# Patient Record
Sex: FEMALE | Race: WHITE | Employment: STUDENT | URBAN - METROPOLITAN AREA
[De-identification: names, ages, dates, MRNs, and addresses within clinical notes are randomized per-mention and may not be internally consistent; named-entity substitution may affect disease eponyms.]

---
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
## 2024-07-29 ENCOUNTER — OFFICE VISIT (OUTPATIENT)
Dept: INTERNAL MEDICINE CLINIC | Age: 19
End: 2024-07-29
Payer: COMMERCIAL

## 2024-07-29 VITALS
SYSTOLIC BLOOD PRESSURE: 120 MMHG | BODY MASS INDEX: 22.84 KG/M2 | HEART RATE: 69 BPM | HEIGHT: 61 IN | DIASTOLIC BLOOD PRESSURE: 62 MMHG | OXYGEN SATURATION: 99 % | WEIGHT: 121 LBS

## 2024-07-29 DIAGNOSIS — Z00.00 ROUTINE GENERAL MEDICAL EXAMINATION AT A HEALTH CARE FACILITY: Primary | ICD-10-CM

## 2024-07-29 DIAGNOSIS — Z00.00 ENCOUNTER FOR WELL ADULT EXAM WITHOUT ABNORMAL FINDINGS: ICD-10-CM

## 2024-07-29 DIAGNOSIS — R79.89 ELEVATED TSH: ICD-10-CM

## 2024-07-29 PROBLEM — L70.0 ACNE VULGARIS: Status: RESOLVED | Noted: 2023-07-26 | Resolved: 2024-07-29

## 2024-07-29 PROBLEM — K58.1 IRRITABLE BOWEL SYNDROME WITH CONSTIPATION: Status: RESOLVED | Noted: 2023-07-26 | Resolved: 2024-07-29

## 2024-07-29 PROCEDURE — 99395 PREV VISIT EST AGE 18-39: CPT | Performed by: INTERNAL MEDICINE

## 2024-07-29 SDOH — ECONOMIC STABILITY: HOUSING INSECURITY
IN THE LAST 12 MONTHS, WAS THERE A TIME WHEN YOU DID NOT HAVE A STEADY PLACE TO SLEEP OR SLEPT IN A SHELTER (INCLUDING NOW)?: NO

## 2024-07-29 SDOH — ECONOMIC STABILITY: FOOD INSECURITY: WITHIN THE PAST 12 MONTHS, THE FOOD YOU BOUGHT JUST DIDN'T LAST AND YOU DIDN'T HAVE MONEY TO GET MORE.: NEVER TRUE

## 2024-07-29 SDOH — ECONOMIC STABILITY: FOOD INSECURITY: WITHIN THE PAST 12 MONTHS, YOU WORRIED THAT YOUR FOOD WOULD RUN OUT BEFORE YOU GOT MONEY TO BUY MORE.: NEVER TRUE

## 2024-07-29 SDOH — ECONOMIC STABILITY: INCOME INSECURITY: HOW HARD IS IT FOR YOU TO PAY FOR THE VERY BASICS LIKE FOOD, HOUSING, MEDICAL CARE, AND HEATING?: NOT HARD AT ALL

## 2024-07-29 ASSESSMENT — PATIENT HEALTH QUESTIONNAIRE - PHQ9
SUM OF ALL RESPONSES TO PHQ QUESTIONS 1-9: 0
1. LITTLE INTEREST OR PLEASURE IN DOING THINGS: NOT AT ALL
SUM OF ALL RESPONSES TO PHQ QUESTIONS 1-9: 0
2. FEELING DOWN, DEPRESSED OR HOPELESS: NOT AT ALL
SUM OF ALL RESPONSES TO PHQ9 QUESTIONS 1 & 2: 0
SUM OF ALL RESPONSES TO PHQ QUESTIONS 1-9: 0
SUM OF ALL RESPONSES TO PHQ QUESTIONS 1-9: 0

## 2024-07-29 NOTE — PATIENT INSTRUCTIONS
Your antiperoxidase antibodies were equivocal last yr.  We'll recheck levels now, you may have risk for Hashimoto's thyroiditis but you do NOT have this currently.  We are just monitoring.       Well Visit, Ages 18 to 65: Care Instructions  Well visits can help you stay healthy. Your doctor has checked your overall health and may have suggested ways to take good care of yourself. Your doctor also may have recommended tests. You can help prevent illness with healthy eating, good sleep, vaccinations, regular exercise, and other steps.    Get the tests that you and your doctor decide on. Depending on your age and risks, examples might include screening for diabetes; hepatitis C; HIV; and cervical, breast, lung, and colon cancer. Screening helps find diseases before any symptoms appear.   Eat healthy foods. Choose fruits, vegetables, whole grains, lean protein, and low-fat dairy foods. Limit saturated fat and reduce salt.     Limit alcohol. Men should have no more than 2 drinks a day. Women should have no more than 1. For some people, no alcohol is the best choice.   Exercise. Get at least 30 minutes of exercise on most days of the week. Walking can be a good choice.     Reach and stay at your healthy weight. This will lower your risk for many health problems.   Take care of your mental health. Try to stay connected with friends, family, and community, and find ways to manage stress.     If you're feeling depressed or hopeless, talk to someone. A counselor can help. If you don't have a counselor, talk to your doctor.   Talk to your doctor if you think you may have a problem with alcohol or drug use. This includes prescription medicines, marijuana, and other drugs.     Avoid tobacco and nicotine: Don't smoke, vape, or chew. If you need help quitting, talk to your doctor.   Practice safer sex. Getting tested, using condoms or dental dams, and limiting sex partners can help prevent STIs.     Use birth control if it's

## 2024-07-29 NOTE — PROGRESS NOTES
Internal Medicine  History and Physical        Sugar Pack  YOB: 2005    Date of Service:  7/29/2024      Chief Complaint:   Sugar Pack is a 18 y.o. female who presents for a comprehensive physical    HPI:   Is at [employer] and to be on waiting list for Rad tech.    Had sl high TSH last yr and equivocal antibodies TPO, then recheck TSH was normal. Denies fatigue, mother w hx of hyperthyroid    Acne better and off topical meds, no problems w IBS now either    Patient Active Problem List   Diagnosis    Irritable bowel syndrome with constipation    Acne vulgaris    Elevated TSH       Preventive Care:  Health Maintenance   Topic Date Due    COVID-19 Vaccine (1) Never done    HPV vaccine (3 - 2-dose series) 11/30/2016    HIV screen  Never done    Chlamydia/GC screen  Never done    Depression Screen  07/26/2024    Flu vaccine (1) 08/01/2024    DTaP/Tdap/Td vaccine (7 - Td or Tdap) 05/31/2026    Hepatitis A vaccine  Completed    Hepatitis B vaccine  Completed    Hib vaccine  Completed    Polio vaccine  Completed    Varicella vaccine  Completed    Meningococcal (ACWY) vaccine  Completed    Hepatitis C screen  Completed    Pneumococcal 0-64 years Vaccine  Aged Out    Measles,Mumps,Rubella (MMR) vaccine  Discontinued        Lipid panel:   Lab Results   Component Value Date/Time    TRIG 73 07/29/2023 09:12 AM    HDL 55 07/29/2023 09:12 AM        Immunization History   Administered Date(s) Administered    DTaP, DAPTACEL, (age 6w-6y), IM, 0.5mL 03/21/2006, 03/02/2009    IHnZ-WLNP-FPS, PEDIARIX, (age 6w-6y), IM, 0.5mL 02/26/2005, 04/30/2005, 07/02/2005    HPV, GARDASIL 9, (age 9y-45y), IM, 0.5mL 05/31/2016, 07/01/2016    Hep A, HAVRIX, VAQTA, (age 12m-18y), IM, 0.5mL 12/21/2006, 12/12/2007    Hib PRP-T, ACTHIB (age 2m-5y, Adlt Risk), HIBERIX (age 6w-4y, Adlt Risk), IM, 0.5mL 02/26/2005, 04/30/2005, 07/02/2005, 12/19/2005    Influenza, FLUARIX, FLULAVAL, FLUZONE (age 6 mo+) AND AFLURIA, (age 3 y+), PF, 0.5mL

## 2024-08-13 LAB
A/G RATIO: 2.1 RATIO (ref 0.8–2.6)
ALBUMIN: 4.5 G/DL (ref 3.5–5.2)
ALP BLD-CCNC: 57 U/L (ref 23–144)
ALT SERPL-CCNC: 10 U/L (ref 0–60)
AST SERPL-CCNC: 13 U/L (ref 0–55)
BILIRUB SERPL-MCNC: 0.6 MG/DL (ref 0–1.2)
BUN / CREAT RATIO: 14 (ref 7–25)
BUN BLDV-MCNC: 10 MG/DL (ref 3–29)
CALCIUM SERPL-MCNC: 9.5 MG/DL (ref 8.5–10.5)
CHLORIDE BLD-SCNC: 104 MEQ/L (ref 96–110)
CHOLESTEROL, TOTAL: 134 MG/DL
CO2: 24 MEQ/L (ref 19–32)
CREAT SERPL-MCNC: 0.7 MG/DL (ref 0.5–1.2)
ESTIMATED GLOMERULAR FILTRATION RATE CREATININE EQUATION: 128 MLS/MIN/1.73M2
FASTING STATUS: NORMAL
GLOBULIN: 2.1 G/DL (ref 1.9–3.6)
GLUCOSE BLD-MCNC: 85 MG/DL (ref 70–99)
HCT VFR BLD CALC: 40.8 % (ref 34–49)
HDLC SERPL-MCNC: 52 MG/DL (ref 31–76)
HEMOGLOBIN: 13.8 G/DL (ref 11.2–15.7)
LDL CHOLESTEROL: 67 MG/DL
MCH RBC QN AUTO: 30.9 PG (ref 26–34)
MCHC RBC AUTO-ENTMCNC: 33.8 G/DL (ref 30.7–35.5)
MCV RBC AUTO: 91.3 FL (ref 80–100)
PDW BLD-RTO: 11.7 %
PLATELET # BLD: 276 K/UL (ref 140–400)
PMV BLD AUTO: 10.2 FL (ref 7.2–11.7)
POTASSIUM SERPL-SCNC: 4.3 MEQ/L (ref 3.4–5.3)
RBC # BLD: 4.47 M/UL (ref 3.95–5.26)
SODIUM BLD-SCNC: 141 MEQ/L (ref 135–148)
T4 FREE: 1.14 NG/DL (ref 0.8–1.8)
TOTAL PROTEIN: 6.6 G/DL (ref 6–8.3)
TRIGL SERPL-MCNC: 77 MG/DL (ref 38–152)
TSH ULTRASENSITIVE: 1.58 MCIU/ML (ref 0.5–4.3)
VLDLC SERPL CALC-MCNC: 15 MG/DL (ref 4–28)
WBC # BLD: 5.7 K/UL (ref 3.5–10.9)

## 2024-08-14 PROBLEM — R76.8 ANTI-TPO ANTIBODIES PRESENT: Status: ACTIVE | Noted: 2024-08-01

## 2024-08-14 LAB — THYROID PEROXIDASE ANTIBODY: 248 EIA

## 2024-08-16 ENCOUNTER — TELEPHONE (OUTPATIENT)
Dept: INTERNAL MEDICINE CLINIC | Age: 19
End: 2024-08-16

## 2024-08-16 DIAGNOSIS — R76.8 ANTI-TPO ANTIBODIES PRESENT: Primary | ICD-10-CM

## 2024-08-16 NOTE — TELEPHONE ENCOUNTER
JUST TO BE SAFE, LET'S HAVE HER RECHECK TSH AND FREE T4 IN 6MO THEN ALSO F/U W ME THE NEXT DAY.- DIAGNOSIS POSITIVE TPO/ANTIPEROXIDASE ANTIBODY WHICH IS ON HER DX LIST  IF SHE IS STABLE AND FEELING WELL, IT'S FINE TO JUST CHECK ANNUALLY AS SOME WILL HAVE A POSITIVE ANTIBODY AND NEVER GET THYROID PROBLEMS, OR THIS MIGHT FLARE UP 20-30 YEARS FROM NOW.    SHE SHOULD LOOK UP SYMPTOMS OF BOTH HYPER AND HYPOTHYROIDISM, THESE CAN BE VERY VAGUE BUT IF HAS EITHER TYPE OF SYMPTOMS COME ON AND ARE LASTING FOR A FEW WEEKS THEN CAN COME IN EARLIER AT ANY TIME TO RECHECK.     Labs added

## 2025-01-22 DIAGNOSIS — Z00.00 PREVENTATIVE HEALTH CARE: Primary | ICD-10-CM

## 2025-02-18 ENCOUNTER — INITIAL CONSULT (OUTPATIENT)
Dept: OBGYN | Age: 20
End: 2025-02-18
Payer: COMMERCIAL

## 2025-02-18 VITALS
HEIGHT: 61 IN | SYSTOLIC BLOOD PRESSURE: 116 MMHG | DIASTOLIC BLOOD PRESSURE: 81 MMHG | HEART RATE: 90 BPM | WEIGHT: 119 LBS | BODY MASS INDEX: 22.47 KG/M2

## 2025-02-18 DIAGNOSIS — N94.6 DYSMENORRHEA: ICD-10-CM

## 2025-02-18 DIAGNOSIS — L70.9 ACNE, UNSPECIFIED ACNE TYPE: ICD-10-CM

## 2025-02-18 DIAGNOSIS — Z01.419 ENCOUNTER FOR ANNUAL ROUTINE GYNECOLOGICAL EXAMINATION: Primary | ICD-10-CM

## 2025-02-18 PROCEDURE — 99385 PREV VISIT NEW AGE 18-39: CPT

## 2025-02-18 RX ORDER — DROSPIRENONE AND ETHINYL ESTRADIOL 0.02-3(28)
1 KIT ORAL DAILY
Qty: 1 PACKET | Refills: 4 | Status: SHIPPED | OUTPATIENT
Start: 2025-02-18

## 2025-02-18 SDOH — ECONOMIC STABILITY: FOOD INSECURITY: WITHIN THE PAST 12 MONTHS, THE FOOD YOU BOUGHT JUST DIDN'T LAST AND YOU DIDN'T HAVE MONEY TO GET MORE.: NEVER TRUE

## 2025-02-18 SDOH — ECONOMIC STABILITY: FOOD INSECURITY: WITHIN THE PAST 12 MONTHS, YOU WORRIED THAT YOUR FOOD WOULD RUN OUT BEFORE YOU GOT MONEY TO BUY MORE.: NEVER TRUE

## 2025-02-18 ASSESSMENT — ENCOUNTER SYMPTOMS
SHORTNESS OF BREATH: 0
ABDOMINAL PAIN: 0
NAUSEA: 0
DIARRHEA: 0
CHEST TIGHTNESS: 0
VOMITING: 0
CONSTIPATION: 0
GASTROINTESTINAL NEGATIVE: 1
RESPIRATORY NEGATIVE: 1

## 2025-02-18 ASSESSMENT — PATIENT HEALTH QUESTIONNAIRE - PHQ9
1. LITTLE INTEREST OR PLEASURE IN DOING THINGS: NOT AT ALL
2. FEELING DOWN, DEPRESSED OR HOPELESS: NOT AT ALL
SUM OF ALL RESPONSES TO PHQ QUESTIONS 1-9: 0
SUM OF ALL RESPONSES TO PHQ9 QUESTIONS 1 & 2: 0

## 2025-02-18 NOTE — PROGRESS NOTES
24    Sugar Pack  2005    Chief Complaint   Patient presents with    Consultation     Pt referral from [facility] for preventative health care.     Annual Exam     Annual exam. Non-smoker No known h/o dvt. Patient's last menstrual period was 2025. . Periods are regular. Patient is sexually active. Patient is not on birth control. Patient complains of aub  lasting 2 days, light, dark red. Pt wants to discuss starting bc.          The patient is a 19 y.o. female,  who presents for her annual exam.  She is menstruating normally, had some breakthrough bleeding with her last cycle.  Patient's last menstrual period was 2025. She is  sexually active. She is not currently taking birth control.    She reports no gynecological symptoms.     Past Medical History:   Diagnosis Date    Abnormal uterine bleeding (AUB)     Acne vulgaris     inactive    Anti-TPO antibodies present 2024    but tfts are normal.    Elevated TSH 2023    Irritable bowel syndrome with constipation     INACTIVE--- ONE EPISODE W ED VISIT TO [facility] APPROX .  SX LATER RESOLVED, HAD EVAL GI.       No past surgical history on file.    Family History   Problem Relation Age of Onset    Other Paternal Grandmother     Other Paternal Aunt         prolonged QT    Other Paternal Uncle         prolonged QT       Social History     Tobacco Use    Smoking status: Never     Passive exposure: Never    Smokeless tobacco: Never   Vaping Use    Vaping status: Never Used   Substance Use Topics    Alcohol use: Never    Drug use: Never        Current Outpatient Medications   Medication Sig Dispense Refill    drospirenone-ethinyl estradiol (MASSIMO) 3-0.02 MG per tablet Take 1 tablet by mouth daily 1 packet 4    vitamin C (ASCORBIC ACID) 500 MG tablet Take 1 tablet by mouth daily      Multiple Vitamins-Minerals (THERAPEUTIC MULTIVITAMIN-MINERALS) tablet Take 1 tablet by mouth daily       No current

## 2025-02-19 LAB
C TRACH DNA UR QL NAA+PROBE: NEGATIVE
N GONORRHOEA DNA UR QL NAA+PROBE: NEGATIVE

## 2025-02-22 LAB
A/G RATIO: 2 RATIO (ref 0.8–2.6)
ALBUMIN: 4.5 G/DL (ref 3.5–5.2)
ALP BLD-CCNC: 50 U/L (ref 23–144)
ALT SERPL-CCNC: 9 U/L (ref 0–60)
AST SERPL-CCNC: 13 U/L (ref 0–55)
BILIRUB SERPL-MCNC: 0.3 MG/DL (ref 0–1.2)
BUN / CREAT RATIO: 23 (ref 7–25)
BUN BLDV-MCNC: 14 MG/DL (ref 3–29)
CALCIUM SERPL-MCNC: 9.2 MG/DL (ref 8.5–10.5)
CHLORIDE BLD-SCNC: 106 MEQ/L (ref 96–110)
CHOLESTEROL, TOTAL: 137 MG/DL
CO2: 25 MEQ/L (ref 19–32)
CREAT SERPL-MCNC: 0.6 MG/DL (ref 0.5–1.2)
ESTIMATED GLOMERULAR FILTRATION RATE CREATININE EQUATION: 132 MLS/MIN/1.73M2
FASTING STATUS: NORMAL
GLOBULIN: 2.2 G/DL (ref 1.9–3.6)
GLUCOSE BLD-MCNC: 79 MG/DL (ref 70–99)
HCT VFR BLD CALC: 41.7 % (ref 34–49)
HDLC SERPL-MCNC: 46 MG/DL
HEMOGLOBIN: 13.9 G/DL (ref 11.2–15.7)
LDL CHOLESTEROL: 84 MG/DL
MCH RBC QN AUTO: 31.2 PG (ref 26–34)
MCHC RBC AUTO-ENTMCNC: 33.3 G/DL (ref 30.7–35.5)
MCV RBC AUTO: 93.5 FL (ref 80–100)
PDW BLD-RTO: 11.3 %
PLATELET # BLD: 310 K/UL (ref 140–400)
PMV BLD AUTO: 10.5 FL (ref 7.2–11.7)
POTASSIUM SERPL-SCNC: 4.4 MEQ/L (ref 3.4–5.3)
RBC # BLD: 4.46 M/UL (ref 3.95–5.26)
SODIUM BLD-SCNC: 142 MEQ/L (ref 135–148)
T4 FREE: 1.12 NG/DL (ref 0.8–1.8)
TOTAL PROTEIN: 6.7 G/DL (ref 6–8.3)
TRIGL SERPL-MCNC: 37 MG/DL
VLDLC SERPL CALC-MCNC: 7 MG/DL (ref 4–28)
WBC # BLD: 4.2 K/UL (ref 3.5–10.9)

## 2025-02-24 LAB — THYROID PEROXIDASE ANTIBODY: 172 EIA

## 2025-04-04 ENCOUNTER — TELEPHONE (OUTPATIENT)
Dept: OBGYN | Age: 20
End: 2025-04-04

## 2025-07-01 RX ORDER — RED BEET 500 MG
CAPSULE ORAL
COMMUNITY

## 2025-07-10 ENCOUNTER — ROUTINE PRENATAL (OUTPATIENT)
Age: 20
End: 2025-07-10

## 2025-07-10 VITALS
SYSTOLIC BLOOD PRESSURE: 126 MMHG | WEIGHT: 129.8 LBS | HEIGHT: 60 IN | DIASTOLIC BLOOD PRESSURE: 64 MMHG | BODY MASS INDEX: 25.48 KG/M2 | HEART RATE: 91 BPM

## 2025-07-10 DIAGNOSIS — Z3A.19 19 WEEKS GESTATION OF PREGNANCY: Primary | ICD-10-CM

## 2025-07-10 PROCEDURE — 0502F SUBSEQUENT PRENATAL CARE: CPT | Performed by: PHYSICIAN ASSISTANT

## 2025-07-10 NOTE — PROGRESS NOTES
Return OB Office Visit    CC:   Chief Complaint   Patient presents with    Routine Prenatal Visit     Pt is 3 here for ANAMARIA after u/s. Denies any LOF, VB, or contractions. Reports +FM.        HPI:  Pt seen and examined. No concerns/complaints. Feeling tired but otherwise good. Denies VB, LOF, ctx. +FM    Objective:  /64   Pulse 91   Ht 1.524 m (5')   Wt 58.9 kg (129 lb 12.8 oz)   LMP 2025   BMI 25.35 kg/m²   Gen: AO, NAD  Abd: Soft, NT  OB US results: Normal 19w M fetus. EDLV, otherwise normal survey.     Assessment/Plan:  19 y.o.  at 19w4d (Estimated Date of Delivery: 25) presents for ANAMARIA appointment:        Diagnosis Orders   1. 19 weeks gestation of pregnancy            Dispo: RTC in 4w for ANAMARIA Mclaughlin PA-C

## 2025-07-30 ENCOUNTER — OFFICE VISIT (OUTPATIENT)
Dept: INTERNAL MEDICINE CLINIC | Age: 20
End: 2025-07-30
Payer: COMMERCIAL

## 2025-07-30 VITALS
DIASTOLIC BLOOD PRESSURE: 60 MMHG | HEART RATE: 89 BPM | SYSTOLIC BLOOD PRESSURE: 102 MMHG | OXYGEN SATURATION: 99 % | BODY MASS INDEX: 26.05 KG/M2 | WEIGHT: 133.4 LBS

## 2025-07-30 DIAGNOSIS — R79.89 ELEVATED TSH: ICD-10-CM

## 2025-07-30 DIAGNOSIS — R76.8 ANTI-TPO ANTIBODIES PRESENT: ICD-10-CM

## 2025-07-30 DIAGNOSIS — Z00.00 ENCOUNTER FOR WELL ADULT EXAM WITHOUT ABNORMAL FINDINGS: ICD-10-CM

## 2025-07-30 DIAGNOSIS — Z3A.22 22 WEEKS GESTATION OF PREGNANCY: ICD-10-CM

## 2025-07-30 DIAGNOSIS — Z00.00 ROUTINE GENERAL MEDICAL EXAMINATION AT A HEALTH CARE FACILITY: Primary | ICD-10-CM

## 2025-07-30 PROCEDURE — 99395 PREV VISIT EST AGE 18-39: CPT | Performed by: INTERNAL MEDICINE

## 2025-07-30 NOTE — PROGRESS NOTES
Internal Medicine  History and Physical        Sugar Pack  YOB: 2005    Date of Service:  7/30/2025      Chief Complaint:   Sugar Pack is a 19 y.o. female who presents for a comprehensive physical    HPI:   GYN seeing [facility]  AIDEE [facility].  Pregnant 22 weeks.  A boy.  Expecting for 11/30.      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    Anti TPO ab positive, we are monitoring for any assoc onset of thyroid dysfxn.  Denies fatitgue.        Patient Active Problem List   Diagnosis    Elevated TSH    Anti-TPO antibodies present       Preventive Care:  Health Maintenance   Topic Date Due    HPV vaccine (3 - 2-dose series) 11/30/2016    HIV screen  Never done    COVID-19 Vaccine (1 - 2024-25 season) Never done    Flu vaccine (1) 08/01/2025    Tdap Vaccine during Pregnancy  08/31/2025    Respiratory Syncytial Virus (RSV) Pregnant or age 60 yrs+ (1 - Risk pregnant 1-dose series) 10/05/2025    Depression Screen  02/18/2026    Chlamydia/GC screen  05/14/2026    DTaP/Tdap/Td vaccine (7 - Td or Tdap) 05/31/2026    Hepatitis A vaccine  Completed    Hepatitis B vaccine  Completed    Hib vaccine  Completed    Polio vaccine  Completed    Varicella vaccine  Completed    Meningococcal (ACWY) vaccine  Completed    Meningococcal B vaccine  Completed    Hepatitis C screen  Completed    Pneumococcal 0-49 years Vaccine  Aged Out    Measles,Mumps,Rubella (MMR) vaccine  Discontinued        Lipid panel:   Lab Results   Component Value Date/Time    TRIG 37 02/22/2025 09:10 AM    HDL 46 02/22/2025 09:10 AM        Immunization History   Administered Date(s) Administered    DTaP, DAPTACEL, (age 6w-6y), IM, 0.5mL 03/21/2006, 03/02/2009    TXmC-RAUV-WAL, PEDIARIX, (age 6w-6y), IM, 0.5mL 02/26/2005, 04/30/2005, 07/02/2005    HPV, GARDASIL 9, (age 9y-45y), IM, 0.5mL 05/31/2016, 07/01/2016    Hep A, HAVRIX, VAQTA, (age 12m-18y), IM, 0.5mL 12/21/2006,

## 2025-08-07 ENCOUNTER — ROUTINE PRENATAL (OUTPATIENT)
Age: 20
End: 2025-08-07

## 2025-08-07 VITALS
BODY MASS INDEX: 26.95 KG/M2 | WEIGHT: 138 LBS | HEART RATE: 90 BPM | SYSTOLIC BLOOD PRESSURE: 120 MMHG | DIASTOLIC BLOOD PRESSURE: 64 MMHG

## 2025-08-07 DIAGNOSIS — Z34.02 PRENATAL CARE, FIRST PREGNANCY IN SECOND TRIMESTER: ICD-10-CM

## 2025-08-07 DIAGNOSIS — Z3A.23 23 WEEKS GESTATION OF PREGNANCY: Primary | ICD-10-CM

## 2025-08-07 PROCEDURE — 0502F SUBSEQUENT PRENATAL CARE: CPT

## 2025-09-05 ENCOUNTER — ROUTINE PRENATAL (OUTPATIENT)
Age: 20
End: 2025-09-05

## 2025-09-05 VITALS — SYSTOLIC BLOOD PRESSURE: 108 MMHG | WEIGHT: 140 LBS | DIASTOLIC BLOOD PRESSURE: 64 MMHG | BODY MASS INDEX: 27.34 KG/M2

## 2025-09-05 DIAGNOSIS — Z34.02 PRENATAL CARE, FIRST PREGNANCY IN SECOND TRIMESTER: Primary | ICD-10-CM

## 2025-09-05 RX ORDER — DOCUSATE SODIUM 100 MG/1
100 CAPSULE, LIQUID FILLED ORAL 2 TIMES DAILY
Qty: 60 CAPSULE | Refills: 3 | Status: SHIPPED | OUTPATIENT
Start: 2025-09-05 | End: 2026-01-03